# Patient Record
Sex: MALE | NOT HISPANIC OR LATINO | Employment: FULL TIME | URBAN - METROPOLITAN AREA
[De-identification: names, ages, dates, MRNs, and addresses within clinical notes are randomized per-mention and may not be internally consistent; named-entity substitution may affect disease eponyms.]

---

## 2020-07-16 ENCOUNTER — OFFICE VISIT (OUTPATIENT)
Dept: INTERNAL MEDICINE CLINIC | Facility: CLINIC | Age: 30
End: 2020-07-16
Payer: COMMERCIAL

## 2020-07-16 VITALS
HEART RATE: 78 BPM | WEIGHT: 222.8 LBS | TEMPERATURE: 98 F | DIASTOLIC BLOOD PRESSURE: 66 MMHG | BODY MASS INDEX: 30.18 KG/M2 | SYSTOLIC BLOOD PRESSURE: 104 MMHG | OXYGEN SATURATION: 98 % | HEIGHT: 72 IN

## 2020-07-16 DIAGNOSIS — Z83.3 FAMILY HISTORY OF DIABETES MELLITUS: ICD-10-CM

## 2020-07-16 DIAGNOSIS — Z00.00 ANNUAL PHYSICAL EXAM: Primary | ICD-10-CM

## 2020-07-16 DIAGNOSIS — Z13.220 SCREENING CHOLESTEROL LEVEL: ICD-10-CM

## 2020-07-16 PROCEDURE — 1036F TOBACCO NON-USER: CPT | Performed by: FAMILY MEDICINE

## 2020-07-16 PROCEDURE — 99213 OFFICE O/P EST LOW 20 MIN: CPT | Performed by: FAMILY MEDICINE

## 2020-07-16 RX ORDER — CALCIUM CARBONATE 200(500)MG
1 TABLET,CHEWABLE ORAL AS NEEDED
COMMUNITY

## 2020-07-16 NOTE — PROGRESS NOTES
Assessment/Plan:  No problem-specific Assessment & Plan notes found for this encounter  Annual physical Exam  -Baseline blood work ordered: CBC, CMP, TSH, Lipid panel   -Recommend performing self-testicular exams   -Recommend exercising for 30 minutes at least 5 days a week  -Recommend low carb/sugar, low salt diet with consumption of fruits, vegetables, lean protein, and whole grains  -Follow up in one year or before then as needed        Problem List Items Addressed This Visit     None      Visit Diagnoses     Annual physical exam    -  Primary    Relevant Orders    Comprehensive metabolic panel    TSH, 3rd generation    CBC and differential    Family history of diabetes mellitus        Relevant Orders    Comprehensive metabolic panel    Screening cholesterol level        Relevant Orders    Lipid panel            Subjective:      Patient ID: Neto Naik is a 26 y o  male  Patient is here for a physical and to establish care at the practice  He is a 26year old male with no significant past medical history  He has had one episode of kidney stones, has occasional GERD controlled with OTC tums, and has had a recent sleep study which 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  He does not wear a CPAP, he is working on getting approved for a special mouth guard  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  He lives with his wife  He says they both cook food and eat "generally healthy" but have not been as much since the pandemic  This has also affected him going to the gym, however he walks and plays tennis with his wife daily  Denies ever smoking or using tobacco products or illicit drugs  He drinks socially approx 1-2 drinks once every week or two weeks  He is up to date on all childhood immunizations but needs to get us the paperwork  His last influenza shot was in fall 2019  He owns guns in the home and has them safely secured and unloaded   He wears a seat belt when driving and does not text and drive  He does not perform self testicular exams  Has a family history of HTN and diabetes  He has not gotten blood work done for a while  No acute complaints today  The following portions of the patient's history were reviewed and updated as appropriate: allergies, current medications, past family history, past medical history, past social history, past surgical history and problem list     Review of Systems   Constitutional: Negative for activity change, appetite change and fever  HENT: Negative for congestion and rhinorrhea  Eyes: Negative for visual disturbance  Respiratory: Negative for cough, chest tightness and shortness of breath  Cardiovascular: Negative for chest pain and palpitations  Gastrointestinal: Negative for abdominal pain, constipation, diarrhea, nausea and vomiting  Genitourinary: Negative for dysuria, frequency and urgency  Musculoskeletal: Negative for arthralgias and myalgias  Skin: Negative for rash  Neurological: Negative for headaches  Objective:    /66 (BP Location: Left arm, Patient Position: Sitting, Cuff Size: Large)   Pulse 78   Temp 98 °F (36 7 °C) (Temporal)   Ht 5' 11 54" (1 817 m)   Wt 101 kg (222 lb 12 8 oz)   SpO2 98%   BMI 30 61 kg/m²          Physical Exam   Constitutional: He appears well-developed and well-nourished  HENT:   Head: Normocephalic and atraumatic  Eyes: Conjunctivae and EOM are normal    Neck: Normal range of motion  Neck supple  Cardiovascular: Normal rate, regular rhythm, normal heart sounds and intact distal pulses  Exam reveals no gallop and no friction rub  No murmur heard  Pulmonary/Chest: Effort normal and breath sounds normal  No respiratory distress  He has no wheezes  He has no rales  Abdominal: Soft  Bowel sounds are normal  He exhibits no distension  There is no tenderness  There is no rebound and no guarding  Musculoskeletal: Normal range of motion   He exhibits no edema or tenderness  Skin: Skin is warm and dry  No rash noted  Psychiatric: He has a normal mood and affect   His behavior is normal

## 2020-07-24 ENCOUNTER — OFFICE VISIT (OUTPATIENT)
Dept: INTERNAL MEDICINE CLINIC | Facility: CLINIC | Age: 30
End: 2020-07-24
Payer: COMMERCIAL

## 2020-07-24 VITALS
OXYGEN SATURATION: 99 % | HEART RATE: 67 BPM | WEIGHT: 229.2 LBS | TEMPERATURE: 98 F | HEIGHT: 72 IN | BODY MASS INDEX: 31.04 KG/M2 | SYSTOLIC BLOOD PRESSURE: 110 MMHG | DIASTOLIC BLOOD PRESSURE: 70 MMHG
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
## 2020-07-24 DIAGNOSIS — S80.862A INSECT BITE OF LEFT LOWER LEG, INITIAL ENCOUNTER: Primary | ICD-10-CM

## 2020-07-24 PROCEDURE — 99213 OFFICE O/P EST LOW 20 MIN: CPT | Performed by: FAMILY MEDICINE

## 2020-07-24 PROCEDURE — 1036F TOBACCO NON-USER: CPT | Performed by: FAMILY MEDICINE

## 2020-07-24 PROCEDURE — 3008F BODY MASS INDEX DOCD: CPT | Performed by: FAMILY MEDICINE

## 2020-07-24 RX ORDER — LORATADINE 10 MG/1
10 TABLET ORAL DAILY
COMMUNITY

## 2020-07-24 NOTE — PROGRESS NOTES
Assessment/Plan:    No problem-specific Assessment & Plan notes found for this encounter  Problem List Items Addressed This Visit        Musculoskeletal and Integument    Insect bite of left lower leg - Primary            Local care, continue with hydrocortisone for itching and oral Benadryl  Okay to use Neosporin if needed  No need for oral antibiotics or oral prednisone  Does not appear to be a tick bite  Call if any issues such as fever, increasing rash or migratory joint pain  All questions and concerns addressed  Subjective:      Patient ID: Lilliana Vásquez is a 26 y o  male  HPI   "insect bite" x 2 days Was in [place] [employer] and [place] with family  Does know when her got it but first noticed it on the 22nd  Took an antihistamine on the first day which helped with the itch  23rd became larger with clear-yellow discharge  Put hydrocortisone cream ointment which helped lessened the redness  Pruritis has imoroved  No pain  The following portions of the patient's history were reviewed and updated as appropriate: allergies, current medications, past family history, past medical history, past social history, past surgical history and problem list     Review of Systems   Constitutional: Negative for chills and fever  Musculoskeletal: Negative for myalgias  Skin: Negative for rash  Objective:      /70 (BP Location: Left arm, Patient Position: Sitting, Cuff Size: Large)   Pulse 67   Temp 98 °F (36 7 °C) (Temporal)   Ht 5' 11 5" (1 816 m)   Wt 104 kg (229 lb 3 2 oz)   SpO2 99%   BMI 31 52 kg/m²          Physical Exam   Constitutional: He is oriented to person, place, and time  Neurological: He is alert and oriented to person, place, and time  Skin:   4 cm erythematous raised inset bite on the right calf        Constitutional:  Well developed, well nourished, no acute distress, non-toxic appearance   Eyes:  PERRL, conjunctiva normal , non icteric sclera  HENT: Atraumatic, oropharynx moist  Neck-  supple   Respiratory:  CTA b/l, normal breath sounds, no rales, no wheezing   Cardiovascular:  RRR, no murmurs, no LE edema b/l  GI:  Soft, nondistended, normal bowel sounds x 4, nontender, no organomegaly, no mass, no rebound, no guarding   Neurologic:  no focal deficits noted   Psychiatric:  Speech and behavior appropriate , AAO x 3

## 2021-03-10 DIAGNOSIS — Z23 ENCOUNTER FOR IMMUNIZATION: ICD-10-CM

## 2024-09-06 ENCOUNTER — OFFICE VISIT (OUTPATIENT)
Age: 34
End: 2024-09-06
Payer: COMMERCIAL

## 2024-09-06 VITALS
SYSTOLIC BLOOD PRESSURE: 106 MMHG | WEIGHT: 232 LBS | HEART RATE: 87 BPM | BODY MASS INDEX: 32.48 KG/M2 | OXYGEN SATURATION: 97 % | TEMPERATURE: 98.1 F | DIASTOLIC BLOOD PRESSURE: 68 MMHG | HEIGHT: 71 IN

## 2024-09-06 DIAGNOSIS — Z80.42 FAMILY HISTORY OF PROSTATE CANCER IN FATHER: ICD-10-CM

## 2024-09-06 DIAGNOSIS — Z82.49 FAMILY HISTORY OF EARLY CAD: ICD-10-CM

## 2024-09-06 DIAGNOSIS — Z83.3 FAMILY HISTORY OF DIABETES MELLITUS IN MOTHER: ICD-10-CM

## 2024-09-06 DIAGNOSIS — G47.33 OSA ON CPAP: ICD-10-CM

## 2024-09-06 DIAGNOSIS — Z23 ENCOUNTER FOR IMMUNIZATION: ICD-10-CM

## 2024-09-06 DIAGNOSIS — Z82.49 FAMILY HISTORY OF ESSENTIAL HYPERTENSION: ICD-10-CM

## 2024-09-06 DIAGNOSIS — Z00.00 ANNUAL PHYSICAL EXAM: Primary | ICD-10-CM

## 2024-09-06 DIAGNOSIS — Z13.220 SCREENING CHOLESTEROL LEVEL: ICD-10-CM

## 2024-09-06 DIAGNOSIS — Z11.59 ENCOUNTER FOR HEPATITIS C SCREENING TEST FOR LOW RISK PATIENT: ICD-10-CM

## 2024-09-06 DIAGNOSIS — Z13.1 SCREENING FOR DIABETES MELLITUS: ICD-10-CM
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
## 2024-09-06 PROBLEM — S80.862A INSECT BITE OF LEFT LOWER LEG: Status: RESOLVED | Noted: 2020-07-24 | Resolved: 2024-09-06

## 2024-09-06 PROCEDURE — 90471 IMMUNIZATION ADMIN: CPT

## 2024-09-06 PROCEDURE — 99385 PREV VISIT NEW AGE 18-39: CPT | Performed by: FAMILY MEDICINE

## 2024-09-06 PROCEDURE — 90715 TDAP VACCINE 7 YRS/> IM: CPT

## 2024-09-06 NOTE — PROGRESS NOTES
Assessment/Plan:    1. Annual physical exam  -     CBC and differential; Future  -     Comprehensive metabolic panel; Future  -     TSH, 3rd generation with Free T4 reflex; Future  2. MUSHTAQ on CPAP  3. Family history of essential hypertension  4. Family history of prostate cancer in father  5. Family history of early CAD  6. Family history of diabetes mellitus in mother  7. Screening for diabetes mellitus  -     Hemoglobin A1C; Future  8. Screening cholesterol level  -     Lipid Panel with Direct LDL reflex; Future  9. Encounter for hepatitis C screening test for low risk patient  -     Hepatitis C antibody; Future  10. BMI 31.0-31.9,adult  -     TSH, 3rd generation with Free T4 reflex; Future      Pt will get his vaccine records for us           There are no Patient Instructions on file for this visit.    Return for Annual physical.    Subjective:      Patient ID: Neto Hernandez is a 34 y.o. male.    Chief Complaint   Patient presents with    Establish Care     Patient is here to re-establish care.    Physical Exam     Annual physical exam.       HPI      Adult Annual Physical   Patient here for a comprehensive physical exam. The patient reports no problems.    Diet and Physical Activity  Diet/Nutrition: well balanced diet.   Exercise: walking.      Depression Screening  PHQ-9 Depression Screening    PHQ-9:    Frequency of the following problems over the past two weeks:            General Health  Sleep: sleeps well.   Hearing: normal - bilateral.  Vision: no vision problems.   Dental: regular dental visits.       /GYN Health    PSA at age 48, father diagnosed with prostate cancer at age 73  Self testicular exams:  no  [place] at age 43        The following portions of the patient's history were reviewed and updated as appropriate: allergies, current medications, past family history, past medical history, past social history, past surgical history and problem list.    Review of Systems      Constitutional:  Denies  "fever or chills   Eyes:  Denies double , blurry vision or eye pain  HENT:  Denies nasal congestion, sore throat or new hearing issues  Respiratory:  Denies cough or shortness of breath or wheezing  Cardiovascular:  Denies palpitations or chest pain  GI:  Denies abdominal pain, nausea, or vomiting, no loose stools, no reflux  Integument:  Denies rash , no open areas  Neurologic:  Denies headache or focal weakness, no dizziness  : no dysuria, or hematuria      Current Outpatient Medications   Medication Sig Dispense Refill    Cholecalciferol (Vitamin D3) 25 MCG (1000 UT) CAPS Take 1 capsule by mouth daily       No current facility-administered medications for this visit.       Objective:    /68 (BP Location: Left arm, Patient Position: Sitting, Cuff Size: Large)   Pulse 87   Temp 98.1 °F (36.7 °C) (Temporal)   Ht 5' 11.46\" (1.815 m)   Wt 105 kg (232 lb)   SpO2 97%   BMI 31.94 kg/m²        Physical Exam      Constitutional:  Well developed, well nourished, no acute distress, non-toxic appearance   Eyes:  PERRL, conjunctiva normal , non icteric sclera  HENT:  Atraumatic, oropharynx moist. Neck-  supple   Respiratory:  CTA b/l, normal breath sounds, no rales, no wheezing   Cardiovascular:  RRR, no murmurs, no LE edema b/l  GI:  Soft, nondistended, normal bowel sounds x 4, nontender, no organomegaly, no mass, no rebound, no guarding   Neurologic:  no focal deficits noted   Psychiatric:  Speech and behavior appropriate , AAO x 3      Ramy Ricardo DO  "

## 2024-09-10 LAB
ALBUMIN SERPL-MCNC: 4.4 G/DL (ref 4.1–5.1)
ALP SERPL-CCNC: 86 IU/L (ref 44–121)
ALT SERPL-CCNC: 24 IU/L (ref 0–44)
AST SERPL-CCNC: 25 IU/L (ref 0–40)
BASOPHILS # BLD AUTO: 0 X10E3/UL (ref 0–0.2)
BASOPHILS NFR BLD AUTO: 1 %
BILIRUB SERPL-MCNC: 0.3 MG/DL (ref 0–1.2)
BUN SERPL-MCNC: 16 MG/DL (ref 6–20)
BUN/CREAT SERPL: 15 (ref 9–20)
CALCIUM SERPL-MCNC: 9.3 MG/DL (ref 8.7–10.2)
CHLORIDE SERPL-SCNC: 106 MMOL/L (ref 96–106)
CHOLEST SERPL-MCNC: 160 MG/DL (ref 100–199)
CO2 SERPL-SCNC: 25 MMOL/L (ref 20–29)
CREAT SERPL-MCNC: 1.05 MG/DL (ref 0.76–1.27)
EGFR: 94 ML/MIN/1.73
EOSINOPHIL # BLD AUTO: 0.1 X10E3/UL (ref 0–0.4)
EOSINOPHIL NFR BLD AUTO: 2 %
ERYTHROCYTE [DISTWIDTH] IN BLOOD BY AUTOMATED COUNT: 13.4 % (ref 11.6–15.4)
GLOBULIN SER-MCNC: 3.2 G/DL (ref 1.5–4.5)
GLUCOSE SERPL-MCNC: 100 MG/DL (ref 70–99)
HBA1C MFR BLD: 6.6 % (ref 4.8–5.6)
HCT VFR BLD AUTO: 42.4 % (ref 37.5–51)
HCV AB S/CO SERPL IA: NON REACTIVE
HDLC SERPL-MCNC: 41 MG/DL
HGB BLD-MCNC: 13.6 G/DL (ref 13–17.7)
IMM GRANULOCYTES # BLD: 0 X10E3/UL (ref 0–0.1)
IMM GRANULOCYTES NFR BLD: 0 %
LDLC SERPL CALC-MCNC: 99 MG/DL (ref 0–99)
LYMPHOCYTES # BLD AUTO: 2 X10E3/UL (ref 0.7–3.1)
LYMPHOCYTES NFR BLD AUTO: 42 %
MCH RBC QN AUTO: 26.7 PG (ref 26.6–33)
MCHC RBC AUTO-ENTMCNC: 32.1 G/DL (ref 31.5–35.7)
MCV RBC AUTO: 83 FL (ref 79–97)
MONOCYTES # BLD AUTO: 0.4 X10E3/UL (ref 0.1–0.9)
MONOCYTES NFR BLD AUTO: 9 %
NEUTROPHILS # BLD AUTO: 2.2 X10E3/UL (ref 1.4–7)
NEUTROPHILS NFR BLD AUTO: 46 %
PLATELET # BLD AUTO: 302 X10E3/UL (ref 150–450)
POTASSIUM SERPL-SCNC: 4.7 MMOL/L (ref 3.5–5.2)
PROT SERPL-MCNC: 7.6 G/DL (ref 6–8.5)
RBC # BLD AUTO: 5.1 X10E6/UL (ref 4.14–5.8)
SODIUM SERPL-SCNC: 142 MMOL/L (ref 134–144)
TRIGL SERPL-MCNC: 112 MG/DL (ref 0–149)
TSH SERPL DL<=0.005 MIU/L-ACNC: 1.21 UIU/ML (ref 0.45–4.5)
WBC # BLD AUTO: 4.7 X10E3/UL (ref 3.4–10.8)

## 2024-09-26 DIAGNOSIS — Z83.3 FAMILY HISTORY OF DIABETES MELLITUS IN MOTHER: Primary | ICD-10-CM
